# Patient Record
Sex: MALE | Race: WHITE | NOT HISPANIC OR LATINO | URBAN - NONMETROPOLITAN AREA
[De-identification: names, ages, dates, MRNs, and addresses within clinical notes are randomized per-mention and may not be internally consistent; named-entity substitution may affect disease eponyms.]

---

## 2018-08-24 PROBLEM — H40.051: Noted: 2018-08-24

## 2018-08-24 PROBLEM — Z96.1: Noted: 2018-08-24

## 2018-08-24 PROBLEM — H43.813: Noted: 2018-08-24

## 2018-08-24 PROBLEM — Z98.41: Noted: 2019-03-28

## 2018-08-24 PROBLEM — H35.373: Noted: 2018-08-24

## 2018-08-24 PROBLEM — H35.40: Noted: 2018-08-24

## 2018-08-24 PROBLEM — H25.811: Noted: 2018-08-24

## 2019-02-02 ENCOUNTER — IMPORTED ENCOUNTER (OUTPATIENT)
Dept: URBAN - NONMETROPOLITAN AREA CLINIC 1 | Facility: CLINIC | Age: 74
End: 2019-02-02

## 2019-02-02 PROCEDURE — 92014 COMPRE OPH EXAM EST PT 1/>: CPT

## 2019-03-15 PROBLEM — H43.813: Noted: 2019-03-15

## 2019-03-15 PROBLEM — Z96.1: Noted: 2019-03-15

## 2019-03-15 PROBLEM — H40.051: Noted: 2019-03-15

## 2019-03-15 PROBLEM — H25.811: Noted: 2019-03-15

## 2019-03-19 ENCOUNTER — IMPORTED ENCOUNTER (OUTPATIENT)
Dept: URBAN - NONMETROPOLITAN AREA CLINIC 1 | Facility: CLINIC | Age: 74
End: 2019-03-19

## 2019-03-28 ENCOUNTER — IMPORTED ENCOUNTER (OUTPATIENT)
Dept: URBAN - NONMETROPOLITAN AREA CLINIC 1 | Facility: CLINIC | Age: 74
End: 2019-03-28

## 2019-03-28 NOTE — PATIENT DISCUSSION
s/p PC IOL OD Stand/Trad 3/27/2019-  discussed findings w/patient-  Pt doing well s/p PCIOL. -  Continue post-op gtts according to instruction sheet and sleep with eye shield over eye for 7 nights. -  Avoid bending at the waist lifting anything over 5lbs and dirty or lexus environments.-  RTC 1 week or prn. Note: patient would like to have Rx done at next visit if possible; 's Notes: MRDFE

## 2019-04-04 ENCOUNTER — IMPORTED ENCOUNTER (OUTPATIENT)
Dept: URBAN - NONMETROPOLITAN AREA CLINIC 1 | Facility: CLINIC | Age: 74
End: 2019-04-04

## 2019-04-04 PROCEDURE — 99024 POSTOP FOLLOW-UP VISIT: CPT

## 2019-04-04 NOTE — PATIENT DISCUSSION
s/p PC IOL OD Stand/Trad 3/27/2019-  discussed findings w/patient-  Pt doing well at 1 week s/p PCIOL. -  Continue post-op gtts according to instruction sheet.-  Okay to resume usual activites and d/c eye shield. -  Patient requested Rx today will not be able to come back for PORM-  RTC 3 mo DFE or prn; 's Notes: MRDFE

## 2019-07-31 ENCOUNTER — IMPORTED ENCOUNTER (OUTPATIENT)
Dept: URBAN - NONMETROPOLITAN AREA CLINIC 1 | Facility: CLINIC | Age: 74
End: 2019-07-31

## 2019-07-31 PROBLEM — H35.373: Noted: 2019-07-31

## 2019-07-31 PROBLEM — H40.051: Noted: 2019-07-31

## 2019-07-31 PROBLEM — H43.813: Noted: 2019-07-31

## 2019-07-31 PROBLEM — H35.40: Noted: 2019-07-31

## 2019-07-31 PROBLEM — H35.62: Noted: 2019-07-31

## 2019-07-31 PROBLEM — Z98.41: Noted: 2019-03-28

## 2019-07-31 PROBLEM — Z96.1: Noted: 2019-07-31

## 2019-07-31 PROCEDURE — 99213 OFFICE O/P EST LOW 20 MIN: CPT

## 2019-07-31 NOTE — PATIENT DISCUSSION
Pseudophakia OU w/PCO OU-  discussed findings w/patient-  1+ PCO noted OU no treatment indicated at this time-  monitor as scheduled or prn Retinal Hemorrhage OS-  discussed findings w/patient-  single heme noted superior OS-  no sneezing no coughing no blood thinners no DM no elevated BP-  will recheck in 1 month w/[facility] in [facility]; 's Notes: MRDFE 7/31/2019

## 2019-08-22 ENCOUNTER — IMPORTED ENCOUNTER (OUTPATIENT)
Dept: URBAN - NONMETROPOLITAN AREA CLINIC 1 | Facility: CLINIC | Age: 74
End: 2019-08-22

## 2019-08-22 PROBLEM — H35.62: Noted: 2019-08-22

## 2019-08-22 PROBLEM — Z98.41: Noted: 2019-08-22

## 2019-08-22 PROBLEM — Z96.1: Noted: 2019-07-31

## 2019-08-22 PROBLEM — H40.051: Noted: 2019-07-31

## 2019-08-22 PROBLEM — H43.813: Noted: 2019-07-31

## 2019-08-22 PROBLEM — H35.40: Noted: 2019-07-31

## 2019-08-22 PROBLEM — H35.373: Noted: 2019-07-31

## 2019-08-22 PROCEDURE — 92250 FUNDUS PHOTOGRAPHY W/I&R: CPT

## 2019-08-22 PROCEDURE — 92014 COMPRE OPH EXAM EST PT 1/>: CPT

## 2019-08-22 NOTE — PATIENT DISCUSSION
Retinal Hemorrhage OS resolved-  discussed findings w/patient-  heme resolved at this time -  Fundus Photos done today: retinal heme OS resolved-  IOP asymmetry noted today (16 24) w/family hx/o GL (mother)-  will have patient RTC 3 month f/u w/24-2 VFIOP Asymmetry -  discussed findings w/patient-  family history of GL in mother-  IOP 16 24 today -  continue without drops at this time -  will have patient RTC for IOP check w/ 24-2 VF in 3 month Pseudophakia OU w/PCO OU-  discussed findings w/patient-  1+ PCO noted OU no treatment indicated at this time-  monitor as scheduled or prn; 's Notes: MRDFE 7/31/2019

## 2019-11-13 ENCOUNTER — IMPORTED ENCOUNTER (OUTPATIENT)
Dept: URBAN - NONMETROPOLITAN AREA CLINIC 1 | Facility: CLINIC | Age: 74
End: 2019-11-13

## 2019-11-13 PROBLEM — H35.40: Noted: 2019-11-13

## 2019-11-13 PROBLEM — H43.813: Noted: 2019-11-13

## 2019-11-13 PROBLEM — H35.373: Noted: 2019-11-13

## 2019-11-13 PROBLEM — H40.053: Noted: 2019-11-13

## 2019-11-13 PROBLEM — Z96.1: Noted: 2019-11-13

## 2019-11-13 PROCEDURE — 99213 OFFICE O/P EST LOW 20 MIN: CPT

## 2019-11-13 NOTE — PATIENT DISCUSSION
Ocular Hypertension OU IOP asymmetry-  discussed findings w/patient-  family history of GL in mother-  IOPs today were 13 24-  continue without drops at this time -  need n/a 24-2 VF was supposed to have it done today-  RTC 6 mo f/u w/OCT ON and Pach-  will have patient RTC for n/a 24-2 VF and call patient w/results Pseudophakia OU w/PCO OU-  discussed findings w/patient-  1+ PCO noted OU no treatment indicated at this time-  monitor as scheduled or prn; 's Notes: MRDFE 7/31/2019

## 2019-11-19 ENCOUNTER — IMPORTED ENCOUNTER (OUTPATIENT)
Dept: URBAN - NONMETROPOLITAN AREA CLINIC 1 | Facility: CLINIC | Age: 74
End: 2019-11-19

## 2019-11-19 PROCEDURE — 92083 EXTENDED VISUAL FIELD XM: CPT

## 2019-11-19 NOTE — PATIENT DISCUSSION
Testing [id number] VF BaselineOD: UR superior limitation no other defectsOS: UR non-specific defects no other defectsThese VF's were the first for the patient. They were not at all reliable and would need to be repeated in order to make clinical decisions.   NL; 's Notes: MRDFE 7/31/201924-2 VF 11/19/2019

## 2020-05-06 ENCOUNTER — IMPORTED ENCOUNTER (OUTPATIENT)
Dept: URBAN - NONMETROPOLITAN AREA CLINIC 1 | Facility: CLINIC | Age: 75
End: 2020-05-06

## 2020-05-06 PROCEDURE — G2012 BRIEF CHECK IN BY MD/QHP: HCPCS

## 2020-05-06 NOTE — PATIENT DISCUSSION
Ocular Hypertension OU-  discussed condition w/patient-  family history of GL in mother-  IOP's at last visit were 13 24-  continue without drops at this time -  24-2 VF done on 11/19/2019    OD: UR superior limitation (possible lid defect)    OS: UR full VF-  continue to monitor at 3 mo Complete w/OCT ON and PachH/o Retinal Heme OS-  discussed condition w/patient-  heme resolved at last visit -  Fund[employer] Photos done 11/13/2019 : retinal heme OS resolved-  IOP asymmetry noted (16 24) w/family hx/o GL (mother)-  RTC 3 mo Complete w/OCT ON and PachPseudophakia OU w/PCO OU-  discussed condition -  patient reports no changes at this time-  1+ PCO noted OU at last visit-  monitor as scheduled or prn; 's Notes: MRDFE 7/31/2019[employer] Photos 11/13/201924-2 VF 11/19/2019

## 2020-11-10 NOTE — PATIENT DISCUSSION
DISCUSSED NEED FOR BLOOD SUGAR TO BE STABLE PRIOR TO HAVING PREOP AND CATARACT SURGERY, OU.  PT UNDERSTANDS.

## 2020-11-23 NOTE — PATIENT DISCUSSION
Continue: erythromycin (erythromycin): ointment: 5 mg/gram (0.5 %) a small amount at bedtime into both eyes 10-

## 2020-12-07 NOTE — PATIENT DISCUSSION
Surgery Counseling: I have discussed the option of scheduling surgery versus following, as well as the risks, benefits and alternatives of cataract surgery with the patient. It was explained that the surgery is medically indicated at this time, and it can be performed at the patient's option as delaying will cause no further deterioration, therefore there is no rush and there is no harm in waiting to have surgery. It was also explained that there is no guarantee that removing the cataract will improve their vision. The patient understands and desires to proceed with cataract surgery with the implantation of an intraocular lens to improve vision for [facility] AND DRIVING_______. I have given the patient the prescribed regimen of the all-in-one drop to use before and after cataract surgery. They have elected to use the all-in-one option of Pred/Gati/Brom(prednisolone acetate,gatifloxacin,and bromfenac. Patient to administer as directed.

## 2021-02-17 NOTE — PATIENT DISCUSSION
PATIENT EDUCATION GIVEN. VISUALLY SIGNIFICANT AND BOTHERSOME TO PATIENT. REFER TO, DR. KERRISON, NEURO OPHTHALMOLOGIST, FOR FURTHER EVAL AND MANAGEMENT. CONTINUE TO MONITOR. RETURN FOR FOLLOW UP, AS SCHEDULED.

## 2021-03-17 ENCOUNTER — IMPORTED ENCOUNTER (OUTPATIENT)
Dept: URBAN - NONMETROPOLITAN AREA CLINIC 1 | Facility: CLINIC | Age: 76
End: 2021-03-17

## 2021-03-17 PROCEDURE — 92015 DETERMINE REFRACTIVE STATE: CPT

## 2021-03-17 PROCEDURE — 76514 ECHO EXAM OF EYE THICKNESS: CPT

## 2021-03-17 PROCEDURE — 92133 CPTRZD OPH DX IMG PST SGM ON: CPT

## 2021-03-17 PROCEDURE — 92014 COMPRE OPH EXAM EST PT 1/>: CPT

## 2021-03-17 NOTE — PATIENT DISCUSSION
Ocular Hypertension OU-  discussed condition w/patient-  family history of GL in mother-  IOP's today were 2030-  Pach done by NL [id number]-  OCT ONH done 3/17/2021    OD: 6/10 SS 79um mild inferior thinning normal superior/inferior/nasal RNFL    OS: 5/10 SS 57um severe superior thinning mild inferior thinning normal     RNFL nasal & temporal-  24-2 VF done on 11/19/2019    OD: UR superior limitation (possible lid defect)    OS: UR full VF-  start Latanoprost QHS OS-  continue to monitor at 3-4 week IOP Check H/o Retinal Heme OS-  discussed condition w/patient-  heme resolved at last visit -  [employer] done 11/13/2019 : retinal heme OS resolved-  IOP asymmetry noted (16 24) w/family hx/o GL (mother)-  RTC 3 mo Complete w/OCT ON and PachPseudophakia OU w/PCO OU-  discussed condition -  patient reports no changes at this time-  1+ PCO noted OU at last visit-  monitor as scheduled or prn; 's Notes: MR 3/17/2021DFE 3/17/2021OCT ON 3/17/2021Pach (NL) [phone] 550[employer] 11/13/201924-2 VF 11/19/2019

## 2021-03-29 NOTE — PATIENT DISCUSSION
S/P PE IOL, _OD__. DOING WELL. CONTINUE PRED-GATI-BROM IN THE SURGICAL EYE  FOR A TOTAL OF 3 WEEKS USE THEN DISCONTINUE. PATIENT DESIRES STANDARD______IOL FOR 2ND EYE. SCHEDULE CATARACT SURGERY.

## 2021-03-29 NOTE — PATIENT DISCUSSION
Pre-Op 2nd Eye Counseling: The patient has noticed an improvement in their visual symptoms in the operative eye. The patient complains of decreased vision in the fellow eye when __DRIVING AND WATCHING TV_______. It was explained to the patient that the decision to proceed with cataract surgery in the fellow eye is entirely a separate decision from the surgical eye. All of the same risks, benefits and alternatives are reviewed with the patient again. The patient does feel the vision in the non-operative eye is limiting their daily activities and elects to proceed with cataract surgery in the ___LEFT____ eye. . I have given the patient the prescribed regimen of  drops to use before and after cataract surgery. Patient to administer as directed.

## 2021-04-07 ENCOUNTER — IMPORTED ENCOUNTER (OUTPATIENT)
Dept: URBAN - NONMETROPOLITAN AREA CLINIC 1 | Facility: CLINIC | Age: 76
End: 2021-04-07

## 2021-04-07 PROCEDURE — 99213 OFFICE O/P EST LOW 20 MIN: CPT

## 2021-04-07 NOTE — PATIENT DISCUSSION
Ocular Hypertension OU-  discussed condition w/patient-  family history of GL in mother-  IOP's today much improved at 24 25-  Pach done by NL [id number]-  OCT ONH done 3/17/2021    OD: 6/10 SS 79um mild inferior thinning normal superior/inferior/nasal RNFL    OS: 5/10 SS 57um severe superior thinning mild inferior thinning normal     RNFL nasal & temporal-  24-2 VF done on 11/19/2019    OD: UR superior limitation (possible lid defect)    OS: UR full VF-  continue Latanoprost QHS OS-  RTC as scheduled or prnH/o Retinal Heme OS-  discussed condition w/patient-  heme resolved at last visit -  [employer] done 11/13/2019 : retinal heme OS resolved-  IOP asymmetry noted (16 24) w/family hx/o GL (mother)-  RTC as scheduled or prnPseudophakia OU w/PCO OU-  discussed condition -  patient reports no changes at this time-  1+ PCO noted OU at last visit-  monitor as scheduled or prn; 's Notes: MR 3/17/2021DFE 3/17/2021OCT ON 3/17/2021Pach (NL) [phone] 550[employer] 11/13/201924-2 VF 11/19/2019

## 2021-05-25 ENCOUNTER — IMPORTED ENCOUNTER (OUTPATIENT)
Dept: URBAN - NONMETROPOLITAN AREA CLINIC 1 | Facility: CLINIC | Age: 76
End: 2021-05-25

## 2021-05-25 NOTE — PATIENT DISCUSSION
Ocular Hypertension OU-  discussed condition w/patient-  family history of GL in mother-  IOP's today much improved at 24 25-  Pach done by NL 26 550-  OCT ONH done 3/17/2021    OD: 6/10 SS 79um mild inferior thinning normal superior/inferior/nasal RNFL    OS: 5/10 SS 57um severe superior thinning mild inferior thinning normal     RNFL nasal & temporal-  24-2 VF done on 11/19/2019    OD: UR superior limitation (possible lid defect)    OS: UR full VF-  continue Latanoprost QHS OS-  RTC as scheduled or prnH/o Retinal Heme OS-  discussed condition w/patient-  heme resolved at last visit -  [employer] done 11/13/2019 : retinal heme OS resolved-  IOP asymmetry noted (16 24) w/family hx/o GL (mother)-  RTC as scheduled or prnPseudophakia OU w/PCO OU-  discussed condition -  patient reports no changes at this time-  1+ PCO noted OU at last visit-  monitor as scheduled or prn; 's Notes: MR 3/17/2021DFE 3/17/2021OCT ON 3/17/2021Pach (NL) [phone] 550[employer] 11/13/201924-2 VF 11/19/2019

## 2021-07-14 ENCOUNTER — IMPORTED ENCOUNTER (OUTPATIENT)
Dept: URBAN - NONMETROPOLITAN AREA CLINIC 1 | Facility: CLINIC | Age: 76
End: 2021-07-14

## 2021-07-14 PROCEDURE — 99213 OFFICE O/P EST LOW 20 MIN: CPT

## 2021-07-14 NOTE — PATIENT DISCUSSION
Ocular Hypertension OU-  discussed condition w/patient-  family history of GL in mother-  IOP's are stable 15 19-  Pach done by NL [id number] 26-  OCT ONH done 3/17/2021    OD: 6/10 SS 79um mild inferior thinning normal superior/inferior/nasal RNFL    OS: 5/10 SS 57um severe superior thinning mild inferior thinning normal     RNFL nasal & temporal-  24-2 VF done on 11/19/2019    OD: UR superior limitation (possible lid defect)    OS: UR full VF-  continue Latanoprost QHS OS-  RTC as scheduled or prnH/o Retinal Heme OS-  discussed condition w/patient-  heme resolved at last visit -  [employer] done 11/13/2019 : retinal heme OS resolved-  IOP asymmetry noted (16 24) w/family hx/o GL (mother)-  RTC as scheduled or prnPseudophakia OU w/PCO OU-  discussed condition -  patient reports no changes at this time-  1+ PCO noted OU at last visit-  monitor as scheduled or prn; 's Notes: MR 3/17/2021DFE 3/17/2021OCT ON 3/17/2021Pach (NL) [phone] 550[employer] 11/13/201924-2 VF 11/19/2019

## 2022-01-12 ENCOUNTER — IMPORTED ENCOUNTER (OUTPATIENT)
Dept: URBAN - NONMETROPOLITAN AREA CLINIC 1 | Facility: CLINIC | Age: 77
End: 2022-01-12

## 2022-01-12 PROCEDURE — 92083 EXTENDED VISUAL FIELD XM: CPT

## 2022-01-12 PROCEDURE — 99213 OFFICE O/P EST LOW 20 MIN: CPT

## 2022-01-12 NOTE — PATIENT DISCUSSION
Ocular Hypertension OU-  discussed condition w/patient-  family history of GL in mother-  IOP's are stable 13 25-  Pach done by  26-  OCT ONH done 3/17/2021    OD: 6/10 SS 79um mild inferior thinning normal superior/inferior/nasal RNFL    OS: 5/10 SS 57um severe superior thinning mild inferior thinning normal     RNFL nasal & temporal-  24-2 VF done on 1/12/2022    OD: UR scattered scotoma non-specific defects stable    OS: UR enlarged BS scattered scotoma non-specific defects stable-  continue Latanoprost QHS OS-  discussed Dyrysta implants (patient defers at this time)-  RTC as scheduled or prnH/o Retinal Heme OS-  discussed condition w/patient-  heme resolved previously-  [employer] done 11/13/2019 : retinal heme OS resolved-  IOP asymmetry noted (16 24) w/family hx/o GL (mother)-  RTC as scheduled or prnPseudophakia OU w/PCO OU-  discussed condition -  patient reports no changes at this time-  1+ PCO noted OU at last visit-  monitor as scheduled or prn; 's Notes: MR 3/17/2021DFE 3/17/2021OCT ON 3/17/2021Pach (NL) [phone] 550[employer] 11/13/201924-2 VF 1/12/2022

## 2022-04-09 ASSESSMENT — VISUAL ACUITY
OD_CC: 20/25-
OS_SC: 20/25-2
OD_CC: 20/20
OU_CC: 20/20-2
OS_SC: 20/20 BLURRY
OS_SC: 20/25+1
OD_SC: 20/20-1
OU_SC: 20/25+1
OD_SC: 20/25+2
OD_SC: 20/25-2
OS_CC: 20/30
OD_GLARE: 20/40
OD_PAM: 20/25+
OU_CC: 20/30
OS_SC: 20/20-1
OS_SC: 20/20-1
OS_SC: 20/20 -1
OD_SC: 20/25-2
OD_SC: 20/20
OD_CC: 20/20-2
OS_CC: 20/25
OD_SC: 20/25-2
OD_CC: 20/25
OS_CC: 20/30-
OS_SC: 20/25-2
OD_SC: 20/20 -1
OS_PH: 20/30-
OS_CC: 20/40+3
OU_SC: 20/20-1
OS_CC: 20/20

## 2022-04-09 ASSESSMENT — PACHYMETRY
OD_CT_UM: 557; ADJ: WNL
OS_CT_UM: 550; ADJ: THIN
OS_CT_UM: 550; ADJ: THIN
OD_CT_UM: 557; ADJ: WNL
OS_CT_UM: 550; ADJ: THIN
OD_CT_UM: 557; ADJ: WNL
OS_CT_UM: 550; ADJ: THIN
OS_CT_UM: 550; ADJ: THIN

## 2022-04-09 ASSESSMENT — TONOMETRY
OS_IOP_MMHG: 22
OS_IOP_MMHG: 20
OD_IOP_MMHG: 17
OS_IOP_MMHG: 22
OD_IOP_MMHG: 22
OD_IOP_MMHG: 16
OS_IOP_MMHG: 22
OS_IOP_MMHG: 30
OD_IOP_MMHG: 21
OD_IOP_MMHG: 21
OD_IOP_MMHG: 22
OD_IOP_MMHG: 17
OD_IOP_MMHG: 20
OD_IOP_MMHG: 18
OS_IOP_MMHG: 20
OS_IOP_MMHG: 20
OS_IOP_MMHG: 21
OS_IOP_MMHG: 20
OS_IOP_MMHG: 24
OD_IOP_MMHG: 16

## 2022-09-27 ENCOUNTER — COMPREHENSIVE EXAM (OUTPATIENT)
Dept: URBAN - NONMETROPOLITAN AREA CLINIC 4 | Facility: CLINIC | Age: 77
End: 2022-09-27

## 2022-09-27 DIAGNOSIS — H40.013: ICD-10-CM

## 2022-09-27 DIAGNOSIS — H43.813: ICD-10-CM

## 2022-09-27 PROCEDURE — 92014 COMPRE OPH EXAM EST PT 1/>: CPT

## 2022-09-27 PROCEDURE — 92133 CPTRZD OPH DX IMG PST SGM ON: CPT

## 2022-09-27 ASSESSMENT — TONOMETRY
OD_IOP_MMHG: 19
OS_IOP_MMHG: 22

## 2022-09-27 ASSESSMENT — VISUAL ACUITY
OD_CC: 20/25
OS_CC: 20/25-2

## 2022-09-27 NOTE — PATIENT DISCUSSION
Ocular Hypertension OU-  discussed condition w/patient-  family history of GL in mother-  IOP's are stable 13 25-  Pach done by NL 55 26-  OCT ONH done 3/17/2021    OD: 6/10 SS 79um mild inferior thinning normal superior/inferior/nasal RNFL    OS: 5/10 SS 57um severe superior thinning mild inferior thinning normal     RNFL nasal & temporal-  24-2 VF done on 1/12/2022    OD: UR scattered scotoma non-specific defects stable    OS: UR enlarged BS scattered scotoma non-specific defects stable-  continue Latanoprost QHS OS-  discussed Dyrysta implants (patient defers at this time)-  RTC as scheduled or prnH/o Retinal Heme OS-  discussed condition w/patient-  heme resolved previously-  [employer] done 11/13/2019 : retinal heme OS resolved-  IOP asymmetry noted (16 24) w/family hx/o GL (mother)-  RTC as scheduled or prnPseudophakia OU w/PCO OU-  discussed condition -  patient reports no changes at this time-  1+ PCO noted OU at last visit-  monitor as scheduled or prn; 's Notes: MR 3/17/2021DFE 3/17/2021OCT ON 3/17/2021Pach (NL) [phone] 550[employer] 11/13/201924-2 VF 1/12/2022.

## 2023-01-11 ENCOUNTER — FOLLOW UP (OUTPATIENT)
Dept: URBAN - NONMETROPOLITAN AREA CLINIC 4 | Facility: CLINIC | Age: 78
End: 2023-01-11

## 2023-01-11 DIAGNOSIS — H35.373: ICD-10-CM

## 2023-01-11 DIAGNOSIS — H40.013: ICD-10-CM

## 2023-01-11 PROCEDURE — 99214 OFFICE O/P EST MOD 30 MIN: CPT

## 2023-01-11 PROCEDURE — 92134 CPTRZ OPH DX IMG PST SGM RTA: CPT

## 2023-01-11 ASSESSMENT — TONOMETRY
OD_IOP_MMHG: 19
OS_IOP_MMHG: 20

## 2023-01-11 ASSESSMENT — VISUAL ACUITY
OD_CC: 20/20
OS_CC: 20/25
OU_CC: J1+
OU_CC: 20/20

## 2023-01-11 NOTE — PATIENT DISCUSSION
Ocular Hypertension OU-  discussed condition w/patient-  family history of GL in mother-  IOP's are stable 13 25-  Pach done by  26-  OCT ONH done 3/17/2021    OD: 6/10 SS 79um mild inferior thinning normal superior/inferior/nasal RNFL    OS: 5/10 SS 57um severe superior thinning mild inferior thinning normal     RNFL nasal & temporal-  24-2 VF done on 1/12/2022    OD: UR scattered scotoma non-specific defects stable    OS: UR enlarged BS scattered scotoma non-specific defects stable-  continue Latanoprost QHS OS-  discussed Dyrysta implants (patient defers at this time)-  RTC as scheduled or prnH/o Retinal Heme OS-  discussed condition w/patient-  heme resolved previously-  [employer] done 11/13/2019 : retinal heme OS resolved-  IOP asymmetry noted (16 24) w/family hx/o GL (mother)-  RTC as scheduled or prnPseudophakia OU w/PCO OU-  discussed condition -  patient reports no changes at this time-  1+ PCO noted OU at last visit-  monitor as scheduled or prn; 's Notes: MR 3/17/2021DFE 3/17/2021OCT ON 3/17/2021Pach (NL) [phone] 550[employer] 11/13/201924-2 VF 1/12/2022.